# Patient Record
Sex: FEMALE | Race: WHITE | Employment: FULL TIME | URBAN - METROPOLITAN AREA
[De-identification: names, ages, dates, MRNs, and addresses within clinical notes are randomized per-mention and may not be internally consistent; named-entity substitution may affect disease eponyms.]

---

## 2021-07-18 ENCOUNTER — APPOINTMENT (OUTPATIENT)
Dept: GENERAL RADIOLOGY | Facility: HOSPITAL | Age: 66
End: 2021-07-18
Attending: EMERGENCY MEDICINE
Payer: COMMERCIAL

## 2021-07-18 ENCOUNTER — HOSPITAL ENCOUNTER (EMERGENCY)
Facility: HOSPITAL | Age: 66
Discharge: HOME | End: 2021-07-18
Attending: EMERGENCY MEDICINE
Payer: COMMERCIAL

## 2021-07-18 VITALS
SYSTOLIC BLOOD PRESSURE: 131 MMHG | TEMPERATURE: 98 F | DIASTOLIC BLOOD PRESSURE: 85 MMHG | OXYGEN SATURATION: 98 % | BODY MASS INDEX: 32.58 KG/M2 | WEIGHT: 215 LBS | HEART RATE: 82 BPM | RESPIRATION RATE: 18 BRPM | HEIGHT: 68 IN

## 2021-07-18 DIAGNOSIS — S20.211A CONTUSION OF RIB ON RIGHT SIDE, INITIAL ENCOUNTER: Primary | ICD-10-CM

## 2021-07-18 DIAGNOSIS — T07.XXXA MULTIPLE ABRASIONS: ICD-10-CM

## 2021-07-18 PROCEDURE — 72170 X-RAY EXAM OF PELVIS: CPT | Performed by: EMERGENCY MEDICINE

## 2021-07-18 PROCEDURE — 99284 EMERGENCY DEPT VISIT MOD MDM: CPT

## 2021-07-18 PROCEDURE — 71101 X-RAY EXAM UNILAT RIBS/CHEST: CPT | Performed by: EMERGENCY MEDICINE

## 2021-07-18 RX ORDER — FEXOFENADINE HCL 180 MG/1
180 TABLET ORAL DAILY
COMMUNITY

## 2021-07-18 RX ORDER — ASPIRIN 81 MG/1
81 TABLET, CHEWABLE ORAL DAILY
COMMUNITY

## 2021-07-18 NOTE — ED QUICK NOTES
Pt presents to the ED for eval of R rib pain. States she's been tired from not being able to sleep. Took Ambien last night and went to bed around 21:30. Pt woke up around 03:30, felt dizzy, and grabbed a cabinet causing ceramic dishes to fall on her.  Bill

## 2021-07-18 NOTE — ED INITIAL ASSESSMENT (HPI)
Patient c/o pain to her right side with pain on inspiration and lacerations to her bottom after she fell at 3 am. States she took an Ambien at 9 pm last night and awoke at 3 am to use the bathroom, became dizzy on standing and tried to steady herself on a

## 2021-07-18 NOTE — ED PROVIDER NOTES
Patient Seen in: [facility] AND [facility] Emergency Department      History   Patient presents with:  Fall    Stated Complaint: Lac on bottom after fall.      HPI/Subjective:   HPI    68-year-old here on low-dose aspirin who took an Ambien last night as she has pain  Cardiovascular: Normal rate, regular rhythm and intact distal pulses. Pulmonary/Chest: Effort normal. No respiratory distress. Abdominal: Soft. There is no tenderness. There is no guarding.    Musculoskeletal: No midline thoracic or lumbar spine dislocation. Dextroscoliosis dorsal spine multilevel spondylosis. Mild chronic wedge compression midthoracic vertebra.  SOFT TISSUES: Tortuous atherosclerotic aorta LUNGS: No acute cardiopulmonary disease OTHER: Prior right shoulder rotator cuff surgery

## 2025-01-22 ENCOUNTER — APPOINTMENT (OUTPATIENT)
Dept: CT IMAGING | Facility: HOSPITAL | Age: 70
End: 2025-01-22
Payer: MEDICARE

## 2025-01-22 ENCOUNTER — APPOINTMENT (OUTPATIENT)
Dept: GENERAL RADIOLOGY | Facility: HOSPITAL | Age: 70
End: 2025-01-22
Attending: EMERGENCY MEDICINE
Payer: MEDICARE

## 2025-01-22 ENCOUNTER — HOSPITAL ENCOUNTER (EMERGENCY)
Facility: HOSPITAL | Age: 70
Discharge: HOME | End: 2025-01-23
Attending: EMERGENCY MEDICINE
Payer: MEDICARE

## 2025-01-22 DIAGNOSIS — S01.81XA FACIAL LACERATION, INITIAL ENCOUNTER: Primary | ICD-10-CM
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
## 2025-01-22 DIAGNOSIS — R42 DIZZINESS: ICD-10-CM

## 2025-01-22 LAB
ANION GAP SERPL CALC-SCNC: 10 MMOL/L (ref 0–18)
BUN BLD-MCNC: 22 MG/DL (ref 9–23)
BUN/CREAT SERPL: 22.7 (ref 10–20)
CALCIUM BLD-MCNC: 10.4 MG/DL (ref 8.7–10.4)
CHLORIDE SERPL-SCNC: 101 MMOL/L (ref 98–112)
CO2 SERPL-SCNC: 27 MMOL/L (ref 21–32)
CREAT BLD-MCNC: 0.97 MG/DL
EGFRCR SERPLBLD CKD-EPI 2021: 62 ML/MIN/1.73M2 (ref 60–?)
GLUCOSE BLD-MCNC: 137 MG/DL (ref 70–99)
GLUCOSE BLDC GLUCOMTR-MCNC: 169 MG/DL (ref 70–99)
OSMOLALITY SERPL CALC.SUM OF ELEC: 291 MOSM/KG (ref 275–295)
POTASSIUM SERPL-SCNC: 3 MMOL/L (ref 3.5–5.1)
SODIUM SERPL-SCNC: 138 MMOL/L (ref 136–145)

## 2025-01-22 PROCEDURE — 99285 EMERGENCY DEPT VISIT HI MDM: CPT

## 2025-01-22 PROCEDURE — 12011 RPR F/E/E/N/L/M 2.5 CM/<: CPT

## 2025-01-22 PROCEDURE — 93010 ELECTROCARDIOGRAM REPORT: CPT

## 2025-01-22 PROCEDURE — 73502 X-RAY EXAM HIP UNI 2-3 VIEWS: CPT | Performed by: EMERGENCY MEDICINE

## 2025-01-22 PROCEDURE — 72125 CT NECK SPINE W/O DYE: CPT

## 2025-01-22 PROCEDURE — 70486 CT MAXILLOFACIAL W/O DYE: CPT

## 2025-01-22 PROCEDURE — 85025 COMPLETE CBC W/AUTO DIFF WBC: CPT | Performed by: EMERGENCY MEDICINE

## 2025-01-22 PROCEDURE — 82962 GLUCOSE BLOOD TEST: CPT

## 2025-01-22 PROCEDURE — 80048 BASIC METABOLIC PNL TOTAL CA: CPT | Performed by: EMERGENCY MEDICINE

## 2025-01-22 PROCEDURE — 70450 CT HEAD/BRAIN W/O DYE: CPT

## 2025-01-22 PROCEDURE — 70450 CT HEAD/BRAIN W/O DYE: CPT | Performed by: EMERGENCY MEDICINE

## 2025-01-22 PROCEDURE — 36415 COLL VENOUS BLD VENIPUNCTURE: CPT

## 2025-01-22 PROCEDURE — 93005 ELECTROCARDIOGRAM TRACING: CPT

## 2025-01-23 VITALS
HEIGHT: 68 IN | WEIGHT: 160 LBS | RESPIRATION RATE: 21 BRPM | HEART RATE: 125 BPM | DIASTOLIC BLOOD PRESSURE: 72 MMHG | BODY MASS INDEX: 24.25 KG/M2 | SYSTOLIC BLOOD PRESSURE: 150 MMHG | TEMPERATURE: 99 F | OXYGEN SATURATION: 95 %

## 2025-01-23 LAB
ATRIAL RATE: 119 BPM
ATRIAL RATE: 127 BPM
BASOPHILS # BLD AUTO: 0.01 X10(3) UL (ref 0–0.2)
BASOPHILS NFR BLD AUTO: 0.1 %
DEPRECATED RDW RBC AUTO: 60.1 FL (ref 35.1–46.3)
EOSINOPHIL # BLD AUTO: 0.02 X10(3) UL (ref 0–0.7)
EOSINOPHIL NFR BLD AUTO: 0.3 %
ERYTHROCYTE [DISTWIDTH] IN BLOOD BY AUTOMATED COUNT: 21.1 % (ref 11–15)
HCT VFR BLD AUTO: 28.1 %
HGB BLD-MCNC: 8.4 G/DL
IMM GRANULOCYTES # BLD AUTO: 0.05 X10(3) UL (ref 0–1)
IMM GRANULOCYTES NFR BLD: 0.7 %
LYMPHOCYTES # BLD AUTO: 0.48 X10(3) UL (ref 1–4)
LYMPHOCYTES NFR BLD AUTO: 7 %
MCH RBC QN AUTO: 23.6 PG (ref 26–34)
MCHC RBC AUTO-ENTMCNC: 29.9 G/DL (ref 31–37)
MCV RBC AUTO: 78.9 FL
MONOCYTES # BLD AUTO: 1.06 X10(3) UL (ref 0.1–1)
MONOCYTES NFR BLD AUTO: 15.6 %
NEUTROPHILS # BLD AUTO: 5.19 X10 (3) UL (ref 1.5–7.7)
NEUTROPHILS # BLD AUTO: 5.19 X10(3) UL (ref 1.5–7.7)
NEUTROPHILS NFR BLD AUTO: 76.3 %
P AXIS: 90 DEGREES
P-R INTERVAL: 144 MS
P-R INTERVAL: 184 MS
PLATELET # BLD AUTO: 127 10(3)UL (ref 150–450)
PLATELETS.RETICULATED NFR BLD AUTO: 10.1 % (ref 0–7)
Q-T INTERVAL: 308 MS
Q-T INTERVAL: 312 MS
QRS DURATION: 68 MS
QRS DURATION: 74 MS
QTC CALCULATION (BEZET): 438 MS
QTC CALCULATION (BEZET): 447 MS
R AXIS: 22 DEGREES
R AXIS: 53 DEGREES
RBC # BLD AUTO: 3.56 X10(6)UL
T AXIS: 42 DEGREES
T AXIS: 67 DEGREES
VENTRICULAR RATE: 119 BPM
VENTRICULAR RATE: 127 BPM
WBC # BLD AUTO: 6.8 X10(3) UL (ref 4–11)

## 2025-01-23 RX ORDER — ACETAMINOPHEN 325 MG/1
650 TABLET ORAL EVERY 4 HOURS PRN
Status: CANCELLED | OUTPATIENT
Start: 2025-01-23

## 2025-01-23 RX ORDER — SENNOSIDES 8.6 MG
17.2 TABLET ORAL NIGHTLY PRN
Status: CANCELLED | OUTPATIENT
Start: 2025-01-23

## 2025-01-23 RX ORDER — BISACODYL 10 MG
10 SUPPOSITORY, RECTAL RECTAL
Status: CANCELLED | OUTPATIENT
Start: 2025-01-23

## 2025-01-23 RX ORDER — ENOXAPARIN SODIUM 100 MG/ML
40 INJECTION SUBCUTANEOUS DAILY
Status: CANCELLED | OUTPATIENT
Start: 2025-01-23

## 2025-01-23 RX ORDER — ONDANSETRON 2 MG/ML
4 INJECTION INTRAMUSCULAR; INTRAVENOUS EVERY 6 HOURS PRN
Status: CANCELLED | OUTPATIENT
Start: 2025-01-23

## 2025-01-23 RX ORDER — HYDROCODONE BITARTRATE AND ACETAMINOPHEN 5; 325 MG/1; MG/1
1 TABLET ORAL EVERY 4 HOURS PRN
Status: CANCELLED | OUTPATIENT
Start: 2025-01-23

## 2025-01-23 RX ORDER — METOCLOPRAMIDE HYDROCHLORIDE 5 MG/ML
5 INJECTION INTRAMUSCULAR; INTRAVENOUS EVERY 8 HOURS PRN
Status: CANCELLED | OUTPATIENT
Start: 2025-01-23

## 2025-01-23 RX ORDER — SODIUM PHOSPHATE, DIBASIC AND SODIUM PHOSPHATE, MONOBASIC 7; 19 G/230ML; G/230ML
1 ENEMA RECTAL ONCE AS NEEDED
Status: CANCELLED | OUTPATIENT
Start: 2025-01-23

## 2025-01-23 RX ORDER — POLYETHYLENE GLYCOL 3350 17 G/17G
17 POWDER, FOR SOLUTION ORAL DAILY PRN
Status: CANCELLED | OUTPATIENT
Start: 2025-01-23

## 2025-01-23 RX ORDER — HYDROCODONE BITARTRATE AND ACETAMINOPHEN 5; 325 MG/1; MG/1
2 TABLET ORAL EVERY 4 HOURS PRN
Status: CANCELLED | OUTPATIENT
Start: 2025-01-23

## 2025-01-23 NOTE — ED QUICK NOTES
Orders for admission, patient is aware of plan and ready to go upstairs. Any questions, please call ED RN Jil at extension [phone].     Patient Covid vaccination status: Fully vaccinated     COVID Test Ordered in ED: None    COVID Suspicion at Admission: N/A    Running Infusions:  None    Mental Status/LOC at time of transport: A/Ox2. Family at bedside.     Other pertinent information:   CIWA score: N/A   NIH score:  N/A

## 2025-01-23 NOTE — DISCHARGE INSTRUCTIONS
Please return to the emergency department or to your primary care doctor or to the urgent care in 7-10 days to have your sutures removed.  Return to the emergency department earlier if you develop fevers, if you see pus coming from the wound, or if you develop redness around the wound that extends beyond 1 inch from the wound edges as these can be signs of wound infection.  Please continue your follow-up appointments with orthopedics and neurology.

## 2025-01-23 NOTE — ED INITIAL ASSESSMENT (HPI)
Pt to the emergency room for an unwitnessed fall, heard by spouse who immediately went to room.Pt was alert and oriented per spouse when walking in, but currently does not recall the fall. Pt newly diagnosed with lewy body dementia. Abrasions noted to her arm and lac to the left eyebrow and bruising to the back of the head. Pt only taking asa per family.

## 2025-01-23 NOTE — ED PROVIDER NOTES
Patient Seen in: [facility] Emergency Department      History     Chief Complaint   Patient presents with    Fall     Stated Complaint: fall unwitnessed, heard by spouse, alert and oriented per spouse when walking i*    Subjective:   HPI  All history obtained from patient's  at bedside.  69-year-old female with recent diagnosis of Lewy body dementia, with history of right hip fracture which was repaired with an IM judith and lesser trochanteric fracture, with known periprosthetic fracture also with diabetes, who presents for evaluation after a fall.  Her  reports that she fell off of her chair while in the kitchen.  He came to her assistance right away and there was no LOC.  She has a laceration near the left eyebrow and a skin tear of the left forearm.  She denies any other pain related complaints.  She is on baby aspirin daily.          Objective:     Past Medical History:    Essential hypertension    Hyperlipidemia    Lewy body dementia (HCC)    Type 1 diabetes mellitus (HCC)              Past Surgical History:   Procedure Laterality Date    Eye surgery                  Social History     Socioeconomic History    Marital status:    Tobacco Use    Smoking status: Never     Social Drivers of Health     Food Insecurity: Food Insecurity Present (6/28/2023)    Received from [facility]    Food Insecurity     Currently or in the past 3 months, have you worried your food would run out before you had money to buy more?: Yes     In the past 12 months, have you run out of food or been unable to get more?: No   Transportation Needs: No Transportation Needs (6/28/2023)    Received from [facility]    Transportation Needs     Currently or in the past 3 months, has lack of transportation kept you from medical appointments, getting food or medicine, or providing care to a family member?: Unrecognized value     Medical Transportation Needs?: No    Received from  [facility]    Social Connections    Received from [facility]    Housing Stability                  Physical Exam     ED Triage Vitals [01/22/25 2030]   /71   Pulse 118   Resp 22   Temp 98.7 °F (37.1 °C)   Temp src Temporal   SpO2 99 %   O2 Device None (Room air)       Current Vitals:   Vital Signs  BP: 150/72  Pulse: (!) 125  Resp: 21  Temp: 98.7 °F (37.1 °C)  Temp src: Temporal  MAP (mmHg): 91    Oxygen Therapy  SpO2: 95 %  O2 Device: None (Room air)        Physical Exam  Vitals and nursing note reviewed.   Constitutional:       Appearance: She is well-developed.   HENT:      Head: Normocephalic.      Comments: 1.5cm laceration at the L lateral eyebrow with surrounding ecchymosis and edema     Mouth/Throat:      Mouth: Mucous membranes are moist.      Pharynx: Oropharynx is clear.   Eyes:      Extraocular Movements: Extraocular movements intact.      Pupils: Pupils are equal, round, and reactive to light.   Cardiovascular:      Rate and Rhythm: Normal rate and regular rhythm.      Heart sounds: Normal heart sounds.   Pulmonary:      Effort: Pulmonary effort is normal.      Breath sounds: Normal breath sounds.   Abdominal:      General: There is no distension.      Palpations: Abdomen is soft.      Tenderness: There is no abdominal tenderness. There is no right CVA tenderness or left CVA tenderness.   Musculoskeletal:         General: Normal range of motion.      Cervical back: Normal range of motion and neck supple. No tenderness.   Skin:     General: Skin is warm.      Capillary Refill: Capillary refill takes less than 2 seconds.      Comments: Skin tear 2cm at the R posterior forearm   Neurological:      General: No focal deficit present.      Mental Status: She is alert.      Cranial Nerves: No cranial nerve deficit.      Comments: No focal deficits       Differential diagnosis includes but is not limited to ICH, skull fracture, facial fracture, laceration,  contusion, electrolyte derangement, arrhythmia    ED Course     Labs Reviewed   BASIC METABOLIC PANEL (8) - Abnormal; Notable for the following components:       Result Value    Glucose 137 (*)     Potassium 3.0 (*)     BUN/CREA Ratio 22.7 (*)     All other components within normal limits   CBC WITH DIFFERENTIAL WITH PLATELET - Abnormal; Notable for the following components:    RBC 3.56 (*)     HGB 8.4 (*)     HCT 28.1 (*)     MCV 78.9 (*)     MCH 23.6 (*)     MCHC 29.9 (*)     RDW-SD 60.1 (*)     RDW 21.1 (*)     .0 (*)     Immature Platelet Fraction 10.1 (*)     Lymphocyte Absolute 0.48 (*)     Monocyte Absolute 1.06 (*)     All other components within normal limits   POCT GLUCOSE - Abnormal; Notable for the following components:    POC Glucose  169 (*)     All other components within normal limits   SCAN SLIDE   RAINBOW DRAW LAVENDER   RAINBOW DRAW LIGHT GREEN   RAINBOW DRAW BLUE     EKG    Rate, intervals and axes as noted on EKG Report.  Rate: 119  Rhythm: Sinus Rhythm  Reading: No STEMI, PVCs and PACs present, motion without present    Repeat EKG at 2341: Sinus rhythm, 127 bpm, PACs present, no ST changes, axes intervals as document on EKG report            CT BRAIN OR HEAD (CPT=70450)    Result Date: 1/22/2025  CONCLUSION:  1. No acute intracranial finding. 2. Large vessel intracranial atherosclerosis. 3. Left periorbital contusion.  1.   Dictated by (CST): Isaiah Randall MD on 1/22/2025 at 10:33 PM     Finalized by (CST): Isaiah Randall MD on 1/22/2025 at 10:36 PM          CT FACIAL BONES (CPT=70486)    Result Date: 1/22/2025  CONCLUSION:  1. No acute fracture.  Mild left periorbital soft tissue contusion. 2. Please see cervical spine report regarding mediastinal and bilateral supraclavicular lymphadenopathy.    Dictated by (CST): Isaiah Randall MD on 1/22/2025 at 10:29 PM     Finalized by (CST): Isaiah Randall MD on 1/22/2025 at 10:33 PM          CT SPINE CERVICAL (CPT=72125)    Result Date:  1/22/2025  CONCLUSION:  1. No acute fracture or subluxation. 2. Mediastinal and bilateral supraclavicular lymphadenopathy.  Differential includes lymphoma and metastatic disease. 3. Fax    Dictated by (CST): Isaiah Randall MD on 1/22/2025 at 10:21 PM     Finalized by (CST): Isaiah Randall MD on 1/22/2025 at 10:28 PM             Preliminary Radiology Report  [facility], [facility]  [phone] - Phone    [facility]    NAME: KARLOS PEGUERO    DATE OF EXAM: 01/22/2025  Patient No:  [id number]  Physician:  GRETCHEN  YOB: 1955    Past Medical History (entered by Technologist):    Reason For Exam (entered by Technologist):  Fall unwitnessed with abrasions noted to her arm, right hip pain and left side head laceration.  Other Notes (entered by Technologist): erpd 1 rm 13    Additional Information (per [facility]):    X-rays of the pelvis and right femur    IMPRESSION:    Intramedullary judith and proximal dynamic interlocking screw in the right femur.  Chronic appearing intertrochanteric right femoral neck fracture deformity.  No acute fracture or malalignment.  Chronic ossific fragment along the tibial tuberosity.    The results were faxed/finalized only at 0050 ET. If you would like to discuss this case directly please call [phone] (extension [phone]).  If you can't reach me at this number, do not leave a voicemail.  Please call [phone] ext [phone] and ask for the next available Radiologist.    Shreya Griggs M.D.     [facility]      Laceration repair:    Verbal consent was obtained from the patient.  The 1.5 cm laceration located L eyebrow was anesthetized in the usual fashion. The wound was scrubbed, draped and explored.   There were no deep structures involved.  No connective tissue injury noted.  The wound was repaired with 3 sutures, 6-0 ethilon .  The wound repair was simple.  The procedure was performed by myself.          Medical Decision Making  On arrival to the ED  patient has mild tachycardia with PACs. BP normal.  Per my independent interpretation of CT brain, no intracranial hemorrhage, other radiology findings noted as above.  Wounds were copiously irrigated and laceration repaired as above.  Patient's  reports that her Tdap is up-to-date.  Additionally right hip x-ray shows ongoing periprosthetic fracture.  We had a lengthy discussion regarding admission as patient's family initially voiced concern that she has difficulty ambulating, ongoing dizziness and felt that she could not be discharged home safely and would be a high fall risk.  I spoke with [facility] hospitalist Dr. Nixon who accepted patient for admission.    Update at 12:20 AM, patient's  requesting to take her home.  She did successfully ambulate in the ED with a walker.  We offered ride home and medic car and lift assistance however patient's  and son report that multiple family members are comfortable lifting her into her home and they will follow-up with orthopedics in 4 days.  Return precautions provided and discussed and wound care measures provided.    Problems Addressed:  Dizziness: complicated acute illness or injury with systemic symptoms  Facial laceration, initial encounter: acute illness or injury with systemic symptoms  Fall from chair, initial encounter: complicated acute illness or injury with systemic symptoms    Amount and/or Complexity of Data Reviewed  Independent Historian: caregiver and spouse     Details: As above  Labs: ordered. Decision-making details documented in ED Course.  Radiology: ordered and independent interpretation performed. Decision-making details documented in ED Course.  ECG/medicine tests: ordered and independent interpretation performed. Decision-making details documented in ED Course.  Discussion of management or test interpretation with external provider(s): As above    Risk  Decision regarding hospitalization.        Disposition and Plan     Clinical  Impression:  1. Facial laceration, initial encounter    2. Fall from chair, initial encounter    3. Dizziness         Disposition:  Discharge  1/23/2025 12:21 am    Follow-up:  No follow-up provider specified.  We recommend that you schedule follow up care with a primary care provider within the next three months to obtain basic health screening including reassessment of your blood pressure.      Medications Prescribed:  Discharge Medication List as of 1/23/2025 12:23 AM              Supplementary Documentation:

## 2025-01-23 NOTE — ED QUICK NOTES
Pt's family will bring pt home and have additional family there to help carry pt up 9 steps inside of the home, pt's  stated they will follow up with ortho on Monday and with any changes in pt's condition call their primary care physician or take pt back to ED. Pt's family stated understanding.